# Patient Record
Sex: MALE | Race: OTHER | HISPANIC OR LATINO
[De-identification: names, ages, dates, MRNs, and addresses within clinical notes are randomized per-mention and may not be internally consistent; named-entity substitution may affect disease eponyms.]

---

## 2018-12-14 ENCOUNTER — APPOINTMENT (OUTPATIENT)
Dept: UROLOGY | Facility: CLINIC | Age: 48
End: 2018-12-14
Payer: COMMERCIAL

## 2018-12-14 VITALS
WEIGHT: 209 LBS | BODY MASS INDEX: 33.59 KG/M2 | DIASTOLIC BLOOD PRESSURE: 83 MMHG | OXYGEN SATURATION: 100 % | HEART RATE: 94 BPM | SYSTOLIC BLOOD PRESSURE: 149 MMHG | HEIGHT: 66 IN | TEMPERATURE: 98.2 F

## 2018-12-14 DIAGNOSIS — R91.1 SOLITARY PULMONARY NODULE: ICD-10-CM

## 2018-12-14 DIAGNOSIS — Z87.891 PERSONAL HISTORY OF NICOTINE DEPENDENCE: ICD-10-CM

## 2018-12-14 PROCEDURE — 99204 OFFICE O/P NEW MOD 45 MIN: CPT

## 2018-12-17 LAB
BACTERIA UR CULT: NORMAL
PSA FREE FLD-MCNC: 56 %
PSA FREE SERPL-MCNC: 1.19 NG/ML
PSA SERPL-MCNC: 2.12 NG/ML

## 2018-12-18 LAB
APPEARANCE: ABNORMAL
BACTERIA: NEGATIVE
BILIRUBIN URINE: NEGATIVE
BLOOD URINE: NEGATIVE
COLOR: YELLOW
GLUCOSE QUALITATIVE U: NEGATIVE MG/DL
HYALINE CASTS: 1 /LPF
KETONES URINE: ABNORMAL
LEUKOCYTE ESTERASE URINE: NEGATIVE
MICROSCOPIC-UA: NORMAL
NITRITE URINE: NEGATIVE
PH URINE: 5
PROTEIN URINE: NEGATIVE MG/DL
RED BLOOD CELLS URINE: 1 /HPF
SPECIFIC GRAVITY URINE: 1.03
SQUAMOUS EPITHELIAL CELLS: 0 /HPF
UROBILINOGEN URINE: NEGATIVE MG/DL
WHITE BLOOD CELLS URINE: 1 /HPF

## 2019-02-22 ENCOUNTER — APPOINTMENT (OUTPATIENT)
Dept: UROLOGY | Facility: CLINIC | Age: 49
End: 2019-02-22
Payer: COMMERCIAL

## 2019-02-22 VITALS
HEIGHT: 66 IN | SYSTOLIC BLOOD PRESSURE: 133 MMHG | TEMPERATURE: 98.7 F | OXYGEN SATURATION: 98 % | DIASTOLIC BLOOD PRESSURE: 83 MMHG | WEIGHT: 209 LBS | BODY MASS INDEX: 33.59 KG/M2 | HEART RATE: 96 BPM

## 2019-02-22 LAB
PSA FREE FLD-MCNC: 55 %
PSA FREE SERPL-MCNC: 0.87 NG/ML
PSA SERPL-MCNC: 1.59 NG/ML

## 2019-02-22 PROCEDURE — 99213 OFFICE O/P EST LOW 20 MIN: CPT

## 2019-02-25 ENCOUNTER — TRANSCRIPTION ENCOUNTER (OUTPATIENT)
Age: 49
End: 2019-02-25

## 2019-02-25 NOTE — ASSESSMENT
[FreeTextEntry1] : BPH \par ED \par \par Continue Sildenafil\par PSA today\par Obtain pathology report from prior prostate biopsy\par PVR= 19 mL\par Peak flowrate= 9.2 ml/s\par f/u 6 months\par  \par \par \par

## 2019-02-25 NOTE — HISTORY OF PRESENT ILLNESS
[Urinary Frequency] : urinary frequency [Nocturia] : nocturia [Post-Void Dribbling] : post-void dribbling [None] : None [FreeTextEntry1] : 50 yo male with BPH, and ED\par Nocturia x's 1 \par Occasional frequency and post void dribbling \par Great response to sildenafil 100 mg, 8/10: happy with outcome\par Off Flomax and finasteride ~ 3 years \par 2014 Prostate Bx (-)\par Dec 2018 PSA = 2.12 \par \par Past Records:\par Neoplasm of uncertain behavior? \par 2013 PSA= 6.1 (UTI during this time) \par 2014 PSA= 1.1\par Prostate size= 44.5 cc [Urinary Incontinence] : no urinary incontinence [Urinary Retention] : no urinary retention [Urinary Urgency] : no urinary urgency [Straining] : no straining [Weak Stream] : no weak stream [Intermittency] : no intermittency [Dysuria] : no dysuria [Hematuria - Gross] : no gross hematuria [Hematuria - Microscopic] : no microscopic hematuria [Bladder Spasm] : no bladder spasm [Abdominal Pain] : no abdominal pain [Flank Pain] : no flank pain [Edema] : ~T edema was not present [Weight Loss] : no recent ~M [unfilled] weight loss [Fever] : no fever [Fatigue] : no fatigue [Nausea] : no nausea [Anorexia] : no anorexia

## 2020-06-16 ENCOUNTER — TRANSCRIPTION ENCOUNTER (OUTPATIENT)
Age: 50
End: 2020-06-16

## 2020-06-23 ENCOUNTER — APPOINTMENT (OUTPATIENT)
Dept: UROLOGY | Facility: CLINIC | Age: 50
End: 2020-06-23
Payer: COMMERCIAL

## 2020-06-23 VITALS
TEMPERATURE: 98 F | HEIGHT: 66 IN | BODY MASS INDEX: 32.95 KG/M2 | DIASTOLIC BLOOD PRESSURE: 82 MMHG | WEIGHT: 205 LBS | HEART RATE: 92 BPM | SYSTOLIC BLOOD PRESSURE: 142 MMHG

## 2020-06-23 PROCEDURE — 99213 OFFICE O/P EST LOW 20 MIN: CPT

## 2020-06-23 NOTE — HISTORY OF PRESENT ILLNESS
[FreeTextEntry1] : retruns for follow up\par using sildenafil with good response intermittently. otherwise does not require medication\par voiding complaints well controlled\par PSA 1.6 2/2019\par generally happy

## 2020-06-23 NOTE — PHYSICAL EXAM
[Penis Abnormality] : normal uncircumcised penis [Urethral Meatus] : meatus normal [Scrotum] : the scrotum was normal [Urinary Bladder Findings] : the bladder was normal on palpation [Testes Tenderness] : no tenderness of the testes [Epididymis] : the epididymides were normal [Testes Mass (___cm)] : there were no testicular masses

## 2020-06-24 LAB — PSA SERPL-MCNC: 2.35 NG/ML

## 2021-05-20 ENCOUNTER — APPOINTMENT (OUTPATIENT)
Dept: PULMONOLOGY | Facility: CLINIC | Age: 51
End: 2021-05-20

## 2021-06-22 ENCOUNTER — APPOINTMENT (OUTPATIENT)
Dept: UROLOGY | Facility: CLINIC | Age: 51
End: 2021-06-22

## 2021-08-27 ENCOUNTER — APPOINTMENT (OUTPATIENT)
Dept: UROLOGY | Facility: CLINIC | Age: 51
End: 2021-08-27
Payer: COMMERCIAL

## 2021-08-27 VITALS
HEART RATE: 108 BPM | WEIGHT: 214 LBS | BODY MASS INDEX: 34.39 KG/M2 | TEMPERATURE: 97.4 F | DIASTOLIC BLOOD PRESSURE: 74 MMHG | SYSTOLIC BLOOD PRESSURE: 121 MMHG | HEIGHT: 66 IN

## 2021-08-27 DIAGNOSIS — N52.9 MALE ERECTILE DYSFUNCTION, UNSPECIFIED: ICD-10-CM

## 2021-08-27 PROCEDURE — 99213 OFFICE O/P EST LOW 20 MIN: CPT

## 2021-08-27 NOTE — PHYSICAL EXAM
[General Appearance - Well Developed] : well developed [General Appearance - Well Nourished] : well nourished [Well Groomed] : well groomed [Normal Appearance] : normal appearance [General Appearance - In No Acute Distress] : no acute distress [Abdomen Soft] : soft [Abdomen Tenderness] : non-tender [Costovertebral Angle Tenderness] : no ~M costovertebral angle tenderness [Urinary Bladder Findings] : the bladder was normal on palpation [Urethral Meatus] : meatus normal [Scrotum] : the scrotum was normal [Testes Mass (___cm)] : there were no testicular masses [Edema] : no peripheral edema [] : no respiratory distress [Exaggerated Use Of Accessory Muscles For Inspiration] : no accessory muscle use [Respiration, Rhythm And Depth] : normal respiratory rhythm and effort [Oriented To Time, Place, And Person] : oriented to person, place, and time [Affect] : the affect was normal [Mood] : the mood was normal [Not Anxious] : not anxious [No Focal Deficits] : no focal deficits [Normal Station and Gait] : the gait and station were normal for the patient's age [No Palpable Adenopathy] : no palpable adenopathy [FreeTextEntry1] : JENI smooth and symmterical

## 2021-08-27 NOTE — HISTORY OF PRESENT ILLNESS
[Currently Experiencing ___] :  [unfilled] [Erectile Dysfunction] : Erectile Dysfunction [None] : None [FreeTextEntry1] : 51 year old male with erectile dysfunction\par Returns for follow up \par Currently on Sildenafil 100 mg, \par Works well 8/10 erection, cannot tolerate  s/e: pounding headache and nasal Congestion\par Requesting alternative PDE5\par Denies voiding problems\par \par PSA  1.6 March 2021\par           1.6 Feb 2019

## 2021-08-27 NOTE — ASSESSMENT
[FreeTextEntry1] : Erectile Dysfunction\par Currently on Sildenafil 100 mg\par unable to tolerate s/e - pounding headache, nasal congestion\par trial Tadalfil 20 mg, take one hr before sexual activity\par informed patient of use and s/e\par \par BPH \par no symptomatic bother\par PSA  1.6 March 2021\par           1.6 Feb 2019 \par \par Follow up annually \par

## 2021-10-15 ENCOUNTER — APPOINTMENT (OUTPATIENT)
Dept: ENDOCRINOLOGY | Facility: CLINIC | Age: 51
End: 2021-10-15
Payer: COMMERCIAL

## 2021-10-15 ENCOUNTER — NON-APPOINTMENT (OUTPATIENT)
Age: 51
End: 2021-10-15

## 2021-10-15 VITALS
BODY MASS INDEX: 34.06 KG/M2 | DIASTOLIC BLOOD PRESSURE: 72 MMHG | SYSTOLIC BLOOD PRESSURE: 147 MMHG | HEART RATE: 93 BPM | WEIGHT: 211 LBS

## 2021-10-15 DIAGNOSIS — K21.9 GASTRO-ESOPHAGEAL REFLUX DISEASE W/OUT ESOPHAGITIS: ICD-10-CM

## 2021-10-15 PROCEDURE — 99204 OFFICE O/P NEW MOD 45 MIN: CPT

## 2021-10-15 RX ORDER — SILDENAFIL 20 MG/1
20 TABLET ORAL
Qty: 90 | Refills: 11 | Status: DISCONTINUED | COMMUNITY
Start: 2018-12-14 | End: 2021-10-15

## 2021-10-15 NOTE — HISTORY OF PRESENT ILLNESS
[FreeTextEntry1] : Mr. Cottrell is a 51 year-old man presenting to establish care with me for elevated body mass index and elevated hemoglobin A1c .\par \par Elevated body mass index. Comorbidities of elevated hemoglobin A1c and gastroesophageal reflux disease.\par His early adult weight was 168 pounds. 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 His highest weight was 226 pounds during the pandemic; he has lost weight by making lifestyle modifications. \par No history of pharmacologic therapy for weight loss.\par He has followed with nutrition in the past; he did not find the visits helpful.\par 24 hour diet recall: breakfast, coffee with milk and 2 teaspoons sugar; lunch, [employer] chicken sandwich with cheese and light mayonnaise; dinner, 1.5 cups pasta with tomato sauce and cheese; occasional juice\par \par No chest pain, shortness of breath, polyuria/polydipsia, lower extremity numbness/tingling. He has a 6 year-old son and 23 year-old stepdaughter.

## 2021-10-15 NOTE — DATA REVIEWED
[FreeTextEntry1] : Laboratories (March 6, 2021) reviewed and significant for: \par Hemoglobin 12.9 g/dL (normal: 13.2-17.1); states chronic with previous negative evaluation\par Unremarkable comprehensive metabolic panel\par \par Laboratories (March 3, 2021) reviewed and significant for: \par HbA1c 6.0%\par TSH 1.17 uIU/mL\par

## 2021-10-15 NOTE — ASSESSMENT
[FreeTextEntry1] : Elevated body mass index. I congratulated him on his weight loss. We reviewed lifestyle modifications for weight loss. We discussed pharmacologic options for weight loss. He is amenable to a trial of bupropion. We discussed the risks and benefits of bupropion, including but not limited to nausea, headache, constipation, decrease in the seizure threshold. \par Lifestyle modification\par Start bupropion  mg daily\par \par Return to see me in 3 months. \par \par CC:\par Dr. Smita Adhikari, Fax [phone]

## 2022-01-13 ENCOUNTER — APPOINTMENT (OUTPATIENT)
Dept: ENDOCRINOLOGY | Facility: CLINIC | Age: 52
End: 2022-01-13
Payer: COMMERCIAL

## 2022-01-13 VITALS
SYSTOLIC BLOOD PRESSURE: 124 MMHG | DIASTOLIC BLOOD PRESSURE: 82 MMHG | HEIGHT: 66 IN | BODY MASS INDEX: 33.59 KG/M2 | HEART RATE: 88 BPM | WEIGHT: 209 LBS

## 2022-01-13 PROCEDURE — 99214 OFFICE O/P EST MOD 30 MIN: CPT

## 2022-01-13 RX ORDER — CHROMIUM 200 MCG
TABLET ORAL
Refills: 0 | Status: ACTIVE | COMMUNITY

## 2022-01-13 RX ORDER — PNV NO.95/FERROUS FUM/FOLIC AC 28MG-0.8MG
TABLET ORAL
Refills: 0 | Status: ACTIVE | COMMUNITY

## 2022-01-13 RX ORDER — ATORVASTATIN CALCIUM 80 MG/1
TABLET, FILM COATED ORAL
Refills: 0 | Status: ACTIVE | COMMUNITY

## 2022-01-13 RX ORDER — OMEPRAZOLE MAGNESIUM 10 MG/1
GRANULE, DELAYED RELEASE ORAL
Refills: 0 | Status: DISCONTINUED | COMMUNITY
End: 2022-01-13

## 2022-01-13 NOTE — DATA REVIEWED
[FreeTextEntry1] : Laboratories (December 23, 2021) reviewed and significant for: \par Hemoglobin 12.4 g/dL (normal: 13.2-17.1); states chronic with previous negative evaluation\par Unremarkable comprehensive metabolic panel\par HbA1c 6.0%\par TSH 1.54 uIU/mL\par  mg/dL\par HDL 40 mg/dL\par Total cholesterol 201 mg/dL\par Triglycerides 110 mg/dL\par 25-hydroxyvitamin D 14 ng/mL

## 2022-01-13 NOTE — ASSESSMENT
[FreeTextEntry1] : Elevated body mass index. Comorbidities of elevated hemoglobin A1c and gastroesophageal reflux disease. I congratulated him on his weight loss. We reviewed lifestyle modifications for weight loss, including intermittent fasting. He declined referral to nutrition. We discussed pharmacologic options for weight loss. He is tolerating bupropion and we will continue. We can consider addition of naltrexone next visit as needed.\par Lifestyle modification\par Continue bupropion  mg daily\par \par Return to see me in 3 months. \par \par CC:\par Dr. Smita Adhikari, Fax [phone]

## 2022-01-13 NOTE — HISTORY OF PRESENT ILLNESS
[FreeTextEntry1] : Mr. Cottrell is a 51 year-old man presenting for follow-up of elevated body mass index and elevated hemoglobin A1c. I saw him for an initial visit in October 2021.\par \par Elevated body mass index. Comorbidities of elevated hemoglobin A1c and gastroesophageal reflux disease.\par His early adult weight was 168 pounds. 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 His highest weight was 226 pounds during the pandemic; he has lost weight by making lifestyle modifications. \par No history of pharmacologic therapy for weight loss.\par He has followed with nutrition in the past; he did not find the visits helpful.\par In October 2021 we started bupropion. \par 24 hour diet recall from initial visit: breakfast, coffee with milk and 2 teaspoons sugar; lunch, [employer] chicken sandwich with cheese and light mayonnaise; dinner, 1.5 cups pasta with tomato sauce and cheese; occasional juice\par \par Interim History \par In October 2021 we started bupropion. He has been on and off bupropion due to gastrointestinal issues; he has been taking for the last week and tolerating well. \par He had dietary indiscretions with the holidays.\par He was started on atorvastatin, vitamin B12, and vitamin D. \par Weight is down 2 pounds since last visit; weight was lower before the holidays. He has a 6 year-old son and 23 year-old stepdaughter. \par Medical and surgical history, medications, allergies, social and family history reviewed and updated as needed.

## 2022-01-13 NOTE — PHYSICAL EXAM
[Alert] : alert [Healthy Appearance] : healthy appearance [No Acute Distress] : no acute distress [Normal Sclera/Conjunctiva] : normal sclera/conjunctiva [Normal Hearing] : hearing was normal [No Respiratory Distress] : no respiratory distress [Kyphosis] : no kyphosis present [No Stigmata of Cushings Syndrome] : no stigmata of Cushings Syndrome [Normal Gait] : normal gait [Acanthosis Nigricans] : acanthosis nigricans present [Normal Insight/Judgement] : insight and judgment were intact [de-identified] : no moon facies, no supraclavicular fat pads

## 2022-04-14 ENCOUNTER — APPOINTMENT (OUTPATIENT)
Dept: ENDOCRINOLOGY | Facility: CLINIC | Age: 52
End: 2022-04-14

## 2022-07-12 ENCOUNTER — EMERGENCY (EMERGENCY)
Facility: HOSPITAL | Age: 52
LOS: 1 days | Discharge: HOME | End: 2022-07-12
Attending: EMERGENCY MEDICINE | Admitting: EMERGENCY MEDICINE
Payer: COMMERCIAL

## 2022-07-12 VITALS
OXYGEN SATURATION: 98 % | TEMPERATURE: 99 F | HEART RATE: 95 BPM | SYSTOLIC BLOOD PRESSURE: 124 MMHG | DIASTOLIC BLOOD PRESSURE: 76 MMHG | RESPIRATION RATE: 18 BRPM

## 2022-07-12 VITALS
HEART RATE: 115 BPM | HEIGHT: 66 IN | OXYGEN SATURATION: 97 % | TEMPERATURE: 100 F | WEIGHT: 210.1 LBS | DIASTOLIC BLOOD PRESSURE: 81 MMHG | RESPIRATION RATE: 22 BRPM | SYSTOLIC BLOOD PRESSURE: 135 MMHG

## 2022-07-12 DIAGNOSIS — U07.1 COVID-19: ICD-10-CM

## 2022-07-12 DIAGNOSIS — I51.7 CARDIOMEGALY: ICD-10-CM

## 2022-07-12 DIAGNOSIS — R05.9 COUGH, UNSPECIFIED: ICD-10-CM

## 2022-07-12 DIAGNOSIS — N40.0 BENIGN PROSTATIC HYPERPLASIA WITHOUT LOWER URINARY TRACT SYMPTOMS: ICD-10-CM

## 2022-07-12 LAB — SARS-COV-2 RNA SPEC QL NAA+PROBE: POSITIVE

## 2022-07-12 PROCEDURE — 87635 SARS-COV-2 COVID-19 AMP PRB: CPT

## 2022-07-12 PROCEDURE — 99285 EMERGENCY DEPT VISIT HI MDM: CPT | Mod: 25

## 2022-07-12 PROCEDURE — 99284 EMERGENCY DEPT VISIT MOD MDM: CPT | Mod: 25

## 2022-07-12 PROCEDURE — 71046 X-RAY EXAM CHEST 2 VIEWS: CPT | Mod: 26

## 2022-07-12 PROCEDURE — 71046 X-RAY EXAM CHEST 2 VIEWS: CPT

## 2022-07-12 PROCEDURE — 93005 ELECTROCARDIOGRAM TRACING: CPT

## 2022-07-12 RX ORDER — ACETAMINOPHEN 500 MG
975 TABLET ORAL ONCE
Refills: 0 | Status: COMPLETED | OUTPATIENT
Start: 2022-07-12 | End: 2022-07-12

## 2022-07-12 RX ADMIN — Medication 975 MILLIGRAM(S): at 09:51

## 2022-07-12 RX ADMIN — Medication 975 MILLIGRAM(S): at 12:08

## 2022-07-12 NOTE — ED ADULT NURSE NOTE - PAIN RATING/NUMBER SCALE (0-10): REST
Patient with prior history of TIA. Continue home meds of Plavix and Lipitor in hospital for prevention.      7

## 2022-07-12 NOTE — ED ADULT NURSE NOTE - OBJECTIVE STATEMENT
Received 52y Male complaining of shortness of breath. Pt speaking clear and in full sentences, airway patent, in no distress. Pt reports Chest tightness, and HA since Friday. Pt tested himself at home last night and came back positive for Covid. Pt denies N/V/D, F/C, abdominal, back pain.

## 2022-07-12 NOTE — ED PROVIDER NOTE - OBJECTIVE STATEMENT
52 M BPH p/w 2 days of cough, congestion, headache, fever + COVID test.  Denies chest pain.  Maybe feels winded at times but no SOB currently.  No le edema or calf ttp.

## 2022-07-12 NOTE — ED PROVIDER NOTE - PATIENT PORTAL LINK FT
You can access the FollowMyHealth Patient Portal offered by [facility] by registering at the following website: [email or web address]. By joining [employer]’s FollowMyHealth portal, you will also be able to view your health information using other applications (apps) compatible with our system.

## 2022-07-12 NOTE — ED PROVIDER NOTE - CLINICAL SUMMARY MEDICAL DECISION MAKING FREE TEXT BOX
SS noted above all consistent with COVID.  CXR and EKG obtained and results noted.  No acute complications identified.  Plan dc and outpt supportive care and follow up.

## 2022-07-26 ENCOUNTER — APPOINTMENT (OUTPATIENT)
Dept: ENDOCRINOLOGY | Facility: CLINIC | Age: 52
End: 2022-07-26

## 2022-07-26 VITALS
WEIGHT: 208 LBS | DIASTOLIC BLOOD PRESSURE: 82 MMHG | HEART RATE: 85 BPM | BODY MASS INDEX: 33.57 KG/M2 | SYSTOLIC BLOOD PRESSURE: 127 MMHG

## 2022-07-26 DIAGNOSIS — E66.9 OBESITY, UNSPECIFIED: ICD-10-CM

## 2022-07-26 DIAGNOSIS — R73.09 OTHER ABNORMAL GLUCOSE: ICD-10-CM

## 2022-07-26 PROCEDURE — 99214 OFFICE O/P EST MOD 30 MIN: CPT

## 2022-07-26 RX ORDER — BUPROPION HYDROCHLORIDE 300 MG/1
300 TABLET, EXTENDED RELEASE ORAL DAILY
Qty: 90 | Refills: 2 | Status: ACTIVE | COMMUNITY
Start: 2021-10-15 | End: 1900-01-01

## 2022-07-26 NOTE — PHYSICAL EXAM
[Alert] : alert [Healthy Appearance] : healthy appearance [No Acute Distress] : no acute distress [Normal Sclera/Conjunctiva] : normal sclera/conjunctiva [Normal Hearing] : hearing was normal [No Respiratory Distress] : no respiratory distress [No Stigmata of Cushings Syndrome] : no stigmata of Cushings Syndrome [Normal Gait] : normal gait [Acanthosis Nigricans] : acanthosis nigricans present [Normal Insight/Judgement] : insight and judgment were intact [Kyphosis] : no kyphosis present [de-identified] : no moon facies, no supraclavicular fat pads

## 2022-07-26 NOTE — DATA REVIEWED
[FreeTextEntry1] : Laboratories (April 1, 2022) reviewed and significant for: \par Hemoglobin 12.8 g/dL (normal: 13.2-17.1); states chronic with previous negative evaluation\par Unremarkable comprehensive metabolic panel\par HbA1c 5.8%\par  mg/dL\par HDL 41 mg/dL\par Total cholesterol 211 mg/dL\par Triglycerides 206 mg/dL\par Vitamin B12 442 pg/mL\par \par Laboratories (December 23, 2021) reviewed and significant for: \par Hemoglobin 12.4 g/dL (normal: 13.2-17.1); states chronic with previous negative evaluation\par Unremarkable comprehensive metabolic panel\par HbA1c 6.0%\par TSH 1.54 uIU/mL\par  mg/dL\par HDL 40 mg/dL\par Total cholesterol 201 mg/dL\par Triglycerides 110 mg/dL\par 25-hydroxyvitamin D 14 ng/mL

## 2022-07-26 NOTE — HISTORY OF PRESENT ILLNESS
[FreeTextEntry1] : Mr. Cottrell is a 52 year-old man presenting for follow-up of elevated body mass index and elevated hemoglobin A1c. I saw him for an initial visit in October 2021 and last in January 2022.\par \par Elevated body mass index. Comorbidities of elevated hemoglobin A1c and gastroesophageal reflux disease.\par His early adult weight was 168 pounds. 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 His highest weight was 226 pounds during the pandemic; he has lost weight by making lifestyle modifications. \par No previous history of pharmacologic therapy for weight loss. In October 2021 we started bupropion. He tolerates well and feels it is effective; he has been on and off therapy. \par He has followed with nutrition in the past; he did not find the visits helpful.\par 24 hour diet recall from initial visit: breakfast, coffee with milk and 2 teaspoons sugar; lunch, [employer] chicken sandwich with cheese and light mayonnaise; dinner, 1.5 cups pasta with tomato sauce and cheese; occasional juice\par \par Interim History \par He has been on and off bupropion; he tolerates well and feels it is effective but forgets to take.\par He has cut out soda and has been drinking water. He has been bringing his lunch in more often. He has been overall decreasing portions of carbohydrates. \par Weight is down 1 pound since last visit; weight is overall down 6 pounds from 214 pounds in August 2021. He is sleeping well. He has a 7 year-old son and 23 year-old stepdaughter. \par Medical and surgical history, medications, allergies, social and family history reviewed and updated as needed.

## 2022-07-26 NOTE — ASSESSMENT
[FreeTextEntry1] : Elevated body mass index. Comorbidities of elevated hemoglobin A1c and gastroesophageal reflux disease. I congratulated him on his overall weight loss. We reviewed lifestyle modifications for weight loss, including intermittent fasting. He is amenable to referral to our nutritionist. We discussed pharmacologic options for weight loss. He is tolerating bupropion and we will restart. We can consider addition of naltrexone next visit as needed.\par Lifestyle modification\par Referral to nutrition\par Restart bupropion  mg daily\par \par Return to see me in 4 months. \par \par CC:\par Dr. Smita Adhikari, Fax [phone]

## 2022-08-26 ENCOUNTER — APPOINTMENT (OUTPATIENT)
Dept: UROLOGY | Facility: CLINIC | Age: 52
End: 2022-08-26

## 2022-08-26 VITALS
DIASTOLIC BLOOD PRESSURE: 85 MMHG | TEMPERATURE: 98.1 F | BODY MASS INDEX: 33.43 KG/M2 | HEART RATE: 90 BPM | HEIGHT: 66 IN | SYSTOLIC BLOOD PRESSURE: 135 MMHG | WEIGHT: 208 LBS

## 2022-08-26 DIAGNOSIS — N40.1 BENIGN PROSTATIC HYPERPLASIA WITH LOWER URINARY TRACT SYMPMS: ICD-10-CM

## 2022-08-26 DIAGNOSIS — R35.1 BENIGN PROSTATIC HYPERPLASIA WITH LOWER URINARY TRACT SYMPMS: ICD-10-CM

## 2022-08-26 PROCEDURE — 99214 OFFICE O/P EST MOD 30 MIN: CPT

## 2022-08-26 RX ORDER — TADALAFIL 20 MG/1
20 TABLET ORAL
Qty: 12 | Refills: 11 | Status: ACTIVE | COMMUNITY
Start: 2021-08-27 | End: 1900-01-01

## 2022-08-26 NOTE — ASSESSMENT
[FreeTextEntry1] : ED \par cialis 20 refilled\par doing great\par \par BPH\par no interest in meds\par UA UCX PSA\par f/u 1 year

## 2022-08-26 NOTE — HISTORY OF PRESENT ILLNESS
[FreeTextEntry1] : ED on cialis 20\par good response\par happy\par \par no voiding complaints\par no pain\par no hematuria

## 2022-08-27 LAB
APPEARANCE: CLEAR
BACTERIA: NEGATIVE
BILIRUBIN URINE: NEGATIVE
BLOOD URINE: NEGATIVE
COLOR: YELLOW
GLUCOSE QUALITATIVE U: NEGATIVE
HYALINE CASTS: 0 /LPF
KETONES URINE: NEGATIVE
LEUKOCYTE ESTERASE URINE: NEGATIVE
MICROSCOPIC-UA: NORMAL
NITRITE URINE: NEGATIVE
PH URINE: 6.5
PROTEIN URINE: NORMAL
PSA SERPL-MCNC: 1.89 NG/ML
RED BLOOD CELLS URINE: 2 /HPF
SPECIFIC GRAVITY URINE: 1.03
SQUAMOUS EPITHELIAL CELLS: 0 /HPF
UROBILINOGEN URINE: NORMAL
WHITE BLOOD CELLS URINE: 1 /HPF

## 2022-09-04 LAB — BACTERIA UR CULT: NORMAL

## 2022-11-18 ENCOUNTER — APPOINTMENT (OUTPATIENT)
Dept: ENDOCRINOLOGY | Facility: CLINIC | Age: 52
End: 2022-11-18

## 2022-12-05 NOTE — ED PROVIDER NOTE - NSICDXPASTMEDICALHX_GEN_ALL_CORE_FT
Called and spoke with the patient, reviewed labs.  Patient has FMLA papers to be filled out, patient made an appointment for 12/13/22 for these to be completed, offered VV but patient declined
PAST MEDICAL HISTORY:  BPH (benign prostatic hyperplasia)

## 2023-08-25 ENCOUNTER — APPOINTMENT (OUTPATIENT)
Dept: UROLOGY | Facility: CLINIC | Age: 53
End: 2023-08-25